# Patient Record
Sex: FEMALE | Employment: PART TIME | URBAN - METROPOLITAN AREA
[De-identification: names, ages, dates, MRNs, and addresses within clinical notes are randomized per-mention and may not be internally consistent; named-entity substitution may affect disease eponyms.]

---

## 2017-06-15 ENCOUNTER — APPOINTMENT (OUTPATIENT)
Dept: ULTRASOUND IMAGING | Age: 32
End: 2017-06-15
Attending: EMERGENCY MEDICINE
Payer: COMMERCIAL

## 2017-06-15 ENCOUNTER — APPOINTMENT (OUTPATIENT)
Dept: CT IMAGING | Age: 32
End: 2017-06-15
Attending: EMERGENCY MEDICINE
Payer: COMMERCIAL

## 2017-06-15 ENCOUNTER — HOSPITAL ENCOUNTER (EMERGENCY)
Age: 32
Discharge: HOME | End: 2017-06-15
Attending: EMERGENCY MEDICINE
Payer: COMMERCIAL

## 2017-06-15 VITALS
WEIGHT: 153.6 LBS | BODY MASS INDEX: 27.21 KG/M2 | HEART RATE: 78 BPM | TEMPERATURE: 98 F | SYSTOLIC BLOOD PRESSURE: 119 MMHG | RESPIRATION RATE: 20 BRPM | HEIGHT: 63 IN | OXYGEN SATURATION: 98 % | DIASTOLIC BLOOD PRESSURE: 75 MMHG

## 2017-06-15 DIAGNOSIS — N83.209 RUPTURED OVARIAN CYST: Primary | ICD-10-CM

## 2017-06-15 LAB
ALBUMIN SERPL BCP-MCNC: 3.5 G/DL (ref 3.5–5)
ALBUMIN/GLOB SERPL: 0.9 {RATIO} (ref 1.1–2.2)
ALP SERPL-CCNC: 46 U/L (ref 45–117)
ALT SERPL-CCNC: 13 U/L (ref 12–78)
ANION GAP BLD CALC-SCNC: 8 MMOL/L (ref 5–15)
APPEARANCE UR: ABNORMAL
AST SERPL W P-5'-P-CCNC: 10 U/L (ref 15–37)
BACTERIA URNS QL MICRO: NEGATIVE /HPF
BASOPHILS # BLD AUTO: 0 K/UL (ref 0–0.1)
BASOPHILS # BLD: 0 % (ref 0–1)
BILIRUB SERPL-MCNC: 0.8 MG/DL (ref 0.2–1)
BILIRUB UR QL CFM: POSITIVE
BUN SERPL-MCNC: 7 MG/DL (ref 6–20)
BUN/CREAT SERPL: 11 (ref 12–20)
CALCIUM SERPL-MCNC: 8.2 MG/DL (ref 8.5–10.1)
CHLORIDE SERPL-SCNC: 105 MMOL/L (ref 97–108)
CO2 SERPL-SCNC: 28 MMOL/L (ref 21–32)
COLOR UR: ABNORMAL
CREAT SERPL-MCNC: 0.63 MG/DL (ref 0.55–1.02)
EOSINOPHIL # BLD: 0.1 K/UL (ref 0–0.4)
EOSINOPHIL NFR BLD: 2 % (ref 0–7)
EPITH CASTS URNS QL MICRO: ABNORMAL /LPF
ERYTHROCYTE [DISTWIDTH] IN BLOOD BY AUTOMATED COUNT: 15.1 % (ref 11.5–14.5)
ERYTHROCYTE [DISTWIDTH] IN BLOOD BY AUTOMATED COUNT: 15.2 % (ref 11.5–14.5)
GLOBULIN SER CALC-MCNC: 3.7 G/DL (ref 2–4)
GLUCOSE SERPL-MCNC: 126 MG/DL (ref 65–100)
GLUCOSE UR STRIP.AUTO-MCNC: NEGATIVE MG/DL
HCG UR QL: NEGATIVE
HCT VFR BLD AUTO: 27.6 % (ref 35–47)
HCT VFR BLD AUTO: 32.4 % (ref 35–47)
HGB BLD-MCNC: 8.5 G/DL (ref 11.5–16)
HGB BLD-MCNC: 9.9 G/DL (ref 11.5–16)
HGB UR QL STRIP: ABNORMAL
HYALINE CASTS URNS QL MICRO: ABNORMAL /LPF (ref 0–5)
KETONES UR QL STRIP.AUTO: 15 MG/DL
LEUKOCYTE ESTERASE UR QL STRIP.AUTO: NEGATIVE
LIPASE SERPL-CCNC: 77 U/L (ref 73–393)
LYMPHOCYTES # BLD AUTO: 33 % (ref 12–49)
LYMPHOCYTES # BLD: 2 K/UL (ref 0.8–3.5)
MCH RBC QN AUTO: 24.1 PG (ref 26–34)
MCH RBC QN AUTO: 24.3 PG (ref 26–34)
MCHC RBC AUTO-ENTMCNC: 30.6 G/DL (ref 30–36.5)
MCHC RBC AUTO-ENTMCNC: 30.8 G/DL (ref 30–36.5)
MCV RBC AUTO: 78.4 FL (ref 80–99)
MCV RBC AUTO: 79.4 FL (ref 80–99)
MONOCYTES # BLD: 0.5 K/UL (ref 0–1)
MONOCYTES NFR BLD AUTO: 8 % (ref 5–13)
NEUTS SEG # BLD: 3.6 K/UL (ref 1.8–8)
NEUTS SEG NFR BLD AUTO: 57 % (ref 32–75)
NITRITE UR QL STRIP.AUTO: NEGATIVE
PH UR STRIP: 6 [PH] (ref 5–8)
PLATELET # BLD AUTO: 281 K/UL (ref 150–400)
PLATELET # BLD AUTO: 339 K/UL (ref 150–400)
POTASSIUM SERPL-SCNC: 3.3 MMOL/L (ref 3.5–5.1)
PROT SERPL-MCNC: 7.2 G/DL (ref 6.4–8.2)
PROT UR STRIP-MCNC: 30 MG/DL
RBC # BLD AUTO: 3.52 M/UL (ref 3.8–5.2)
RBC # BLD AUTO: 4.08 M/UL (ref 3.8–5.2)
RBC #/AREA URNS HPF: >100 /HPF (ref 0–5)
SODIUM SERPL-SCNC: 141 MMOL/L (ref 136–145)
SP GR UR REFRACTOMETRY: 1.02 (ref 1–1.03)
UROBILINOGEN UR QL STRIP.AUTO: 1 EU/DL (ref 0.2–1)
WBC # BLD AUTO: 5.9 K/UL (ref 3.6–11)
WBC # BLD AUTO: 6.2 K/UL (ref 3.6–11)
WBC URNS QL MICRO: ABNORMAL /HPF (ref 0–4)

## 2017-06-15 PROCEDURE — 36415 COLL VENOUS BLD VENIPUNCTURE: CPT | Performed by: EMERGENCY MEDICINE

## 2017-06-15 PROCEDURE — 80053 COMPREHEN METABOLIC PANEL: CPT | Performed by: EMERGENCY MEDICINE

## 2017-06-15 PROCEDURE — 76830 TRANSVAGINAL US NON-OB: CPT

## 2017-06-15 PROCEDURE — 99285 EMERGENCY DEPT VISIT HI MDM: CPT

## 2017-06-15 PROCEDURE — 96361 HYDRATE IV INFUSION ADD-ON: CPT

## 2017-06-15 PROCEDURE — 85025 COMPLETE CBC W/AUTO DIFF WBC: CPT | Performed by: EMERGENCY MEDICINE

## 2017-06-15 PROCEDURE — 83690 ASSAY OF LIPASE: CPT | Performed by: EMERGENCY MEDICINE

## 2017-06-15 PROCEDURE — 74011250636 HC RX REV CODE- 250/636: Performed by: EMERGENCY MEDICINE

## 2017-06-15 PROCEDURE — 85027 COMPLETE CBC AUTOMATED: CPT | Performed by: OBSTETRICS & GYNECOLOGY

## 2017-06-15 PROCEDURE — 76856 US EXAM PELVIC COMPLETE: CPT

## 2017-06-15 PROCEDURE — 74177 CT ABD & PELVIS W/CONTRAST: CPT

## 2017-06-15 PROCEDURE — 74011000258 HC RX REV CODE- 258: Performed by: EMERGENCY MEDICINE

## 2017-06-15 PROCEDURE — 96374 THER/PROPH/DIAG INJ IV PUSH: CPT

## 2017-06-15 PROCEDURE — 96375 TX/PRO/DX INJ NEW DRUG ADDON: CPT

## 2017-06-15 PROCEDURE — 81001 URINALYSIS AUTO W/SCOPE: CPT | Performed by: EMERGENCY MEDICINE

## 2017-06-15 PROCEDURE — 96376 TX/PRO/DX INJ SAME DRUG ADON: CPT

## 2017-06-15 PROCEDURE — 74011636320 HC RX REV CODE- 636/320: Performed by: EMERGENCY MEDICINE

## 2017-06-15 PROCEDURE — 81025 URINE PREGNANCY TEST: CPT

## 2017-06-15 RX ORDER — MORPHINE SULFATE 4 MG/ML
4 INJECTION, SOLUTION INTRAMUSCULAR; INTRAVENOUS ONCE
Status: COMPLETED | OUTPATIENT
Start: 2017-06-15 | End: 2017-06-15

## 2017-06-15 RX ORDER — HYDROCODONE BITARTRATE AND ACETAMINOPHEN 5; 325 MG/1; MG/1
1 TABLET ORAL
Qty: 20 TAB | Refills: 0 | Status: SHIPPED | OUTPATIENT
Start: 2017-06-15 | End: 2017-07-07

## 2017-06-15 RX ORDER — IBUPROFEN 600 MG/1
600 TABLET ORAL
Qty: 20 TAB | Refills: 0 | Status: SHIPPED | OUTPATIENT
Start: 2017-06-15 | End: 2017-07-07

## 2017-06-15 RX ORDER — ONDANSETRON 4 MG/1
4 TABLET, ORALLY DISINTEGRATING ORAL
Qty: 8 TAB | Refills: 0 | Status: SHIPPED | OUTPATIENT
Start: 2017-06-15 | End: 2017-07-07

## 2017-06-15 RX ORDER — SODIUM CHLORIDE 0.9 % (FLUSH) 0.9 %
10 SYRINGE (ML) INJECTION
Status: COMPLETED | OUTPATIENT
Start: 2017-06-15 | End: 2017-06-15

## 2017-06-15 RX ORDER — ONDANSETRON 2 MG/ML
4 INJECTION INTRAMUSCULAR; INTRAVENOUS
Status: COMPLETED | OUTPATIENT
Start: 2017-06-15 | End: 2017-06-15

## 2017-06-15 RX ADMIN — IOPAMIDOL 100 ML: 755 INJECTION, SOLUTION INTRAVENOUS at 12:46

## 2017-06-15 RX ADMIN — Medication 4 MG: at 09:14

## 2017-06-15 RX ADMIN — Medication 4 MG: at 11:37

## 2017-06-15 RX ADMIN — ONDANSETRON 4 MG: 2 INJECTION INTRAMUSCULAR; INTRAVENOUS at 09:14

## 2017-06-15 RX ADMIN — SODIUM CHLORIDE 100 ML: 900 INJECTION, SOLUTION INTRAVENOUS at 12:46

## 2017-06-15 RX ADMIN — Medication 10 ML: at 12:46

## 2017-06-15 RX ADMIN — SODIUM CHLORIDE 1000 ML: 900 INJECTION, SOLUTION INTRAVENOUS at 11:37

## 2017-06-15 NOTE — DISCHARGE INSTRUCTIONS
We hope that we have addressed all of your medical concerns. The examination and treatment you received in the Emergency Department were for an emergent problem and were not intended as complete care. It is important that you follow up with your healthcare provider(s) for ongoing care. If your symptoms worsen or do not improve as expected, and you are unable to reach your usual health care provider(s), you should return to the Emergency Department. Today's healthcare is undergoing tremendous change, and patient satisfaction surveys are one of the many tools to assess the quality of medical care. You may receive a survey from the [employer] [place] [employer] regarding your experience in the Emergency Department. I hope that your experience has been completely positive, particularly the medical care that I provided. As such, please participate in the survey; anything less than excellent does not meet my expectations or intentions. [facility] and [facility] participate in nationally recognized quality of care measures. If your blood pressure is greater than 120/80, as reported below, we urge that you seek medical care to address the potential of high blood pressure, commonly known as hypertension. Hypertension can be hereditary or can be caused by certain medical conditions, pain, stress, or \"white coat syndrome. \"       Please make an appointment with your health care provider(s) for follow up of your Emergency Department visit. VITALS:   Patient Vitals for the past 8 hrs:   Temp Pulse Resp BP SpO2   06/15/17 1430 - - - 119/62 99 %   06/15/17 1400 - - - 136/85 100 %   06/15/17 1300 - - - 133/86 99 %   06/15/17 1200 - - - 121/54 98 %   06/15/17 1100 - - - 124/81 98 %   06/15/17 0830 - - - 118/77 92 %   06/15/17 0807 97.7 °F (36.5 °C) 88 16 114/77 97 %          Thank you for allowing us to provide you with medical care today.   We realize that you have many choices for your emergency care needs. Please choose us in the future for any continued health care needs. Dane Vinson MD    [facility]   Office: [phone]            Recent Results (from the past 24 hour(s))   CBC WITH AUTOMATED DIFF    Collection Time: 06/15/17  8:13 AM   Result Value Ref Range    WBC 6.2 3.6 - 11.0 K/uL    RBC 4.08 3.80 - 5.20 M/uL    HGB 9.9 (L) 11.5 - 16.0 g/dL    HCT 32.4 (L) 35.0 - 47.0 %    MCV 79.4 (L) 80.0 - 99.0 FL    MCH 24.3 (L) 26.0 - 34.0 PG    MCHC 30.6 30.0 - 36.5 g/dL    RDW 15.1 (H) 11.5 - 14.5 %    PLATELET 044 135 - 957 K/uL    NEUTROPHILS 57 32 - 75 %    LYMPHOCYTES 33 12 - 49 %    MONOCYTES 8 5 - 13 %    EOSINOPHILS 2 0 - 7 %    BASOPHILS 0 0 - 1 %    ABS. NEUTROPHILS 3.6 1.8 - 8.0 K/UL    ABS. LYMPHOCYTES 2.0 0.8 - 3.5 K/UL    ABS. MONOCYTES 0.5 0.0 - 1.0 K/UL    ABS. EOSINOPHILS 0.1 0.0 - 0.4 K/UL    ABS. BASOPHILS 0.0 0.0 - 0.1 K/UL   METABOLIC PANEL, COMPREHENSIVE    Collection Time: 06/15/17  8:13 AM   Result Value Ref Range    Sodium 141 136 - 145 mmol/L    Potassium 3.3 (L) 3.5 - 5.1 mmol/L    Chloride 105 97 - 108 mmol/L    CO2 28 21 - 32 mmol/L    Anion gap 8 5 - 15 mmol/L    Glucose 126 (H) 65 - 100 mg/dL    BUN 7 6 - 20 MG/DL    Creatinine 0.63 0.55 - 1.02 MG/DL    BUN/Creatinine ratio 11 (L) 12 - 20      GFR est AA >60 >60 ml/min/1.73m2    GFR est non-AA >60 >60 ml/min/1.73m2    Calcium 8.2 (L) 8.5 - 10.1 MG/DL    Bilirubin, total 0.8 0.2 - 1.0 MG/DL    ALT (SGPT) 13 12 - 78 U/L    AST (SGOT) 10 (L) 15 - 37 U/L    Alk.  phosphatase 46 45 - 117 U/L    Protein, total 7.2 6.4 - 8.2 g/dL    Albumin 3.5 3.5 - 5.0 g/dL    Globulin 3.7 2.0 - 4.0 g/dL    A-G Ratio 0.9 (L) 1.1 - 2.2     LIPASE    Collection Time: 06/15/17  8:13 AM   Result Value Ref Range    Lipase 77 73 - 393 U/L   URINALYSIS W/ RFLX MICROSCOPIC    Collection Time: 06/15/17 10:15 AM   Result Value Ref Range    Color DARK YELLOW      Appearance CLOUDY (A) CLEAR      Specific gravity 1.025 1.003 - 1.030      pH (UA) 6.0 5.0 - 8.0      Protein 30 (A) NEG mg/dL    Glucose NEGATIVE  NEG mg/dL    Ketone 15 (A) NEG mg/dL    Blood LARGE (A) NEG      Urobilinogen 1.0 0.2 - 1.0 EU/dL    Nitrites NEGATIVE  NEG      Leukocyte Esterase NEGATIVE  NEG      WBC 0-4 0 - 4 /hpf    RBC >100 (H) 0 - 5 /hpf    Epithelial cells FEW FEW /lpf    Bacteria NEGATIVE  NEG /hpf    Hyaline cast 2-5 0 - 5 /lpf   BILIRUBIN, CONFIRM    Collection Time: 06/15/17 10:15 AM   Result Value Ref Range    Bilirubin UA, confirm POSITIVE (A) NEG     HCG URINE, QL. - POC    Collection Time: 06/15/17 10:26 AM   Result Value Ref Range    Pregnancy test,urine (POC) NEGATIVE  NEG     CBC W/O DIFF    Collection Time: 06/15/17  1:50 PM   Result Value Ref Range    WBC 5.9 3.6 - 11.0 K/uL    RBC 3.52 (L) 3.80 - 5.20 M/uL    HGB 8.5 (L) 11.5 - 16.0 g/dL    HCT 27.6 (L) 35.0 - 47.0 %    MCV 78.4 (L) 80.0 - 99.0 FL    MCH 24.1 (L) 26.0 - 34.0 PG    MCHC 30.8 30.0 - 36.5 g/dL    RDW 15.2 (H) 11.5 - 14.5 %    PLATELET 924 322 - 250 K/uL       Ct Abd Pelv W Cont    Result Date: 6/15/2017  INDICATION: free fluid  possible hemoperitoneum COMPARISON: Earlier pelvic ultrasound. TECHNIQUE: Following the uneventful intravenous administration of 100 cc Isovue-370, thin axial images were obtained through the abdomen and pelvis. Coronal and sagittal reconstructions were generated. Oral contrast was not administered. CT dose reduction was achieved through use of a standardized protocol tailored for this examination and automatic exposure control for dose modulation. FINDINGS: LUNG BASES: Clear. LIVER: No mass or biliary dilatation. GALLBLADDER: Unremarkable. SPLEEN: No mass. PANCREAS: No mass or ductal dilatation. ADRENALS: Unremarkable. KIDNEYS: No mass, calculus, or hydronephrosis. GI: No dilatation or wall thickening. APPENDIX: Not confidently identified. PERITONEUM: There is no pneumoperitoneum.  There is generalized peritoneal fluid which has not density of 33. This is higher than water. As suggested by the previous ultrasound this may be complex fluid and hemoperitoneum cannot be excluded by this exam. RETROPERITONEUM: No lymphadenopathy or aortic aneurysm. URINARY BLADDER: No mass or calculus. PELVIS: There is marked enlargement of the uterus. The endometrial cavity is identified on coronal imaging (424-89). Cephalad of this is a large rounded heterogeneously enhancing mass. This is 13.6 cm in diameter and consistent with a large uterine fibroid. Adnexa cannot be identified. BONES: No destructive bone lesion. ADDITIONAL COMMENTS: N/A     IMPRESSION: There is generalized ascites which has density of 33. This may be complex ascites. As suggested by ultrasound, hemoperitoneum cannot be totally excluded although this is slightly less dense than usual for hemoperitoneum. There is a large fibroid arising from the uterus. Us Transvaginal    Result Date: 6/15/2017  CLINICAL HISTORY: Lower abdominal pain, history of fibroids Pelvic ultrasound: Realtime sonographic imaging of the pelvis was performed transabdominally. The uterus measures 18.8 x 14.2 x 10.9 cm and is myomatous in appearance. The endometrial stripe and ovaries are not visualized  due to the enlarged myomatous uterus. Moderate ascites is noted in the abdomen and pelvis, somewhat heterogeneous in the pelvis. IMPRESSION: Myomatous uterus. Moderate ascites, somewhat heterogeneous concerning for blood products. Nonvisualization of the ovaries and endometrium. Correlation with transvaginal ultrasound will be performed. Transvaginal ultrasound: Realtime sonographic imaging of the pelvis was performed transvaginally. The uterus is enlarged and myomatous in appearance. The largest fibroid measures 12.7 x 14.8 x 12.1 cm in the left uterine body. The endometrial stripe measures 14 mm.  The right ovary measures 3.4 x 2.2 x 2.2 cm and the left ovary measures 3.7 x 2.1 x 2.4 cm. There is a 1.6 x 1.8 x 1.2 cm simple cyst in the left ovary. The right ovary is normal in appearance. Blood flow is documented within both ovaries. Heterogeneous free fluid is noted in the pelvis. IMPRESSION: Heterogeneous free fluid in the pelvis concerning for hemoperitoneum. This may be related to ruptured cyst but other etiologies cannot be excluded. Enlarged myomatous uterus. Simple cyst left ovary. Us Pelv Non Obs    Result Date: 6/15/2017  CLINICAL HISTORY: Lower abdominal pain, history of fibroids Pelvic ultrasound: Realtime sonographic imaging of the pelvis was performed transabdominally. The uterus measures 18.8 x 14.2 x 10.9 cm and is myomatous in appearance. The endometrial stripe and ovaries are not visualized  due to the enlarged myomatous uterus. Moderate ascites is noted in the abdomen and pelvis, somewhat heterogeneous in the pelvis. IMPRESSION: Myomatous uterus. Moderate ascites, somewhat heterogeneous concerning for blood products. Nonvisualization of the ovaries and endometrium. Correlation with transvaginal ultrasound will be performed. Transvaginal ultrasound: Realtime sonographic imaging of the pelvis was performed transvaginally. The uterus is enlarged and myomatous in appearance. The largest fibroid measures 12.7 x 14.8 x 12.1 cm in the left uterine body. The endometrial stripe measures 14 mm. The right ovary measures 3.4 x 2.2 x 2.2 cm and the left ovary measures 3.7 x 2.1 x 2.4 cm. There is a 1.6 x 1.8 x 1.2 cm simple cyst in the left ovary. The right ovary is normal in appearance. Blood flow is documented within both ovaries. Heterogeneous free fluid is noted in the pelvis. IMPRESSION: Heterogeneous free fluid in the pelvis concerning for hemoperitoneum. This may be related to ruptured cyst but other etiologies cannot be excluded. Enlarged myomatous uterus. Simple cyst left ovary.

## 2017-06-15 NOTE — LETTER
[address] 53 
[address] [place] [facility] [id number] 
[phone] Work/School Note Date: 6/15/2017 To Whom It May concern: 
 
Dickson Reed was seen and treated today in the emergency room by the following provider(s): 
No providers found. Dickson Reed may return to work on 6/17/2017. Sincerely, Liborio Reyes RN

## 2017-06-15 NOTE — PROGRESS NOTES
OB Hospitalist Note:    To ED to see pt with known fibroids with abdominal pain. Discussed with Dr. Juve Dumont. US reviewed. To ED to see pt and she is not in room.  Gone to Drea Fleming MD

## 2017-06-15 NOTE — CONSULTS
Gyn Consult    Subjective:     Edvin Barrios is a 33 y.o.  A3  premenopausal female who is being seen for abdominal pain. Pt reports she has know she had fibroids since November of last year. She doesn't have a specific plan in place and reports that Dr. Antonetta Sandifer of [facility] is her GYN MD. She reports her LMP was 6-3 and no birth control as she is not currently sexually active. She has some abdominal pain on  that she thought was due to something she ate. She felt better and then ran errands yesterday and had the pain again today. She reports the pain is more crampy than sharp and she reports it is lower abdominal in location. She denies nausea, vomiting, fever, chills, headache, vaginal discharge, UTI symptoms but does have some irregular spotting. OB/GYN ROS: no vaginal bleeding, no discharge or pelvic pain, cyclic withdrawal menses only, no hot flashes, she complains of above    OB/GYN history: obstetric history: ( : 4, Para: 1, Misc/Ab: 3) and contraception (none)  x 1 age 6, SAB x 2, ectopic x 1 with salpingectomy on right. Patient Active Problem List    Diagnosis Date Noted    Anemia 2010     Past Medical History:   Diagnosis Date    Ectopic pregnancy     Fibroids     uterine      Past Surgical History:   Procedure Laterality Date    HX GYN      1 tube removed for ectopic pregnancy      Social History   Substance Use Topics    Smoking status: Never Smoker    Smokeless tobacco: Never Used    Alcohol use No      Family History   Problem Relation Age of Onset    Hypertension Mother       None     No Known Allergies     Review of Systems:  10 point ROS,  Pertinent items are noted in the History of Present Illness.      Objective:     Patient Vitals for the past 8 hrs:   BP Temp Pulse Resp SpO2 Height Weight   06/15/17 1300 133/86 - - - 99 % - -   06/15/17 1100 124/81 - - - 98 % - -   06/15/17 0830 118/77 - - - 92 % - -   06/15/17 4643 114/77 97.7 °F (36.5 °C) 88 16 97 % 5' 3\" (1.6 m) 69.7 kg (153 lb 9.6 oz)     Temp (24hrs), Av.7 °F (36.5 °C), Min:97.7 °F (36.5 °C), Max:97.7 °F (36.5 °C)         Physical Exam: pt was resting comfortably when I entered the room  Abdomen: soft, non-tender. Bowel sounds normal. Uterus approximately 24 week size but nontender, NO rebound and NO guarding  Pelvic: deferred as pt plans on following up with her own private GYN MD    Labs:    Recent Results (from the past 24 hour(s))   CBC WITH AUTOMATED DIFF    Collection Time: 06/15/17  8:13 AM   Result Value Ref Range    WBC 6.2 3.6 - 11.0 K/uL    RBC 4.08 3.80 - 5.20 M/uL    HGB 9.9 (L) 11.5 - 16.0 g/dL    HCT 32.4 (L) 35.0 - 47.0 %    MCV 79.4 (L) 80.0 - 99.0 FL    MCH 24.3 (L) 26.0 - 34.0 PG    MCHC 30.6 30.0 - 36.5 g/dL    RDW 15.1 (H) 11.5 - 14.5 %    PLATELET 246 194 - 986 K/uL    NEUTROPHILS 57 32 - 75 %    LYMPHOCYTES 33 12 - 49 %    MONOCYTES 8 5 - 13 %    EOSINOPHILS 2 0 - 7 %    BASOPHILS 0 0 - 1 %    ABS. NEUTROPHILS 3.6 1.8 - 8.0 K/UL    ABS. LYMPHOCYTES 2.0 0.8 - 3.5 K/UL    ABS. MONOCYTES 0.5 0.0 - 1.0 K/UL    ABS. EOSINOPHILS 0.1 0.0 - 0.4 K/UL    ABS. BASOPHILS 0.0 0.0 - 0.1 K/UL   METABOLIC PANEL, COMPREHENSIVE    Collection Time: 06/15/17  8:13 AM   Result Value Ref Range    Sodium 141 136 - 145 mmol/L    Potassium 3.3 (L) 3.5 - 5.1 mmol/L    Chloride 105 97 - 108 mmol/L    CO2 28 21 - 32 mmol/L    Anion gap 8 5 - 15 mmol/L    Glucose 126 (H) 65 - 100 mg/dL    BUN 7 6 - 20 MG/DL    Creatinine 0.63 0.55 - 1.02 MG/DL    BUN/Creatinine ratio 11 (L) 12 - 20      GFR est AA >60 >60 ml/min/1.73m2    GFR est non-AA >60 >60 ml/min/1.73m2    Calcium 8.2 (L) 8.5 - 10.1 MG/DL    Bilirubin, total 0.8 0.2 - 1.0 MG/DL    ALT (SGPT) 13 12 - 78 U/L    AST (SGOT) 10 (L) 15 - 37 U/L    Alk.  phosphatase 46 45 - 117 U/L    Protein, total 7.2 6.4 - 8.2 g/dL    Albumin 3.5 3.5 - 5.0 g/dL    Globulin 3.7 2.0 - 4.0 g/dL    A-G Ratio 0.9 (L) 1.1 - 2.2     LIPASE    Collection Time: 06/15/17  8:13 AM   Result Value Ref Range    Lipase 77 73 - 393 U/L   URINALYSIS W/ RFLX MICROSCOPIC    Collection Time: 06/15/17 10:15 AM   Result Value Ref Range    Color DARK YELLOW      Appearance CLOUDY (A) CLEAR      Specific gravity 1.025 1.003 - 1.030      pH (UA) 6.0 5.0 - 8.0      Protein 30 (A) NEG mg/dL    Glucose NEGATIVE  NEG mg/dL    Ketone 15 (A) NEG mg/dL    Blood LARGE (A) NEG      Urobilinogen 1.0 0.2 - 1.0 EU/dL    Nitrites NEGATIVE  NEG      Leukocyte Esterase NEGATIVE  NEG      WBC 0-4 0 - 4 /hpf    RBC >100 (H) 0 - 5 /hpf    Epithelial cells FEW FEW /lpf    Bacteria NEGATIVE  NEG /hpf    Hyaline cast 2-5 0 - 5 /lpf   BILIRUBIN, CONFIRM    Collection Time: 06/15/17 10:15 AM   Result Value Ref Range    Bilirubin UA, confirm POSITIVE (A) NEG     HCG URINE, QL. - POC    Collection Time: 06/15/17 10:26 AM   Result Value Ref Range    Pregnancy test,urine (POC) NEGATIVE  NEG     CBC W/O DIFF    Collection Time: 06/15/17  1:50 PM   Result Value Ref Range    WBC 5.9 3.6 - 11.0 K/uL    RBC 3.52 (L) 3.80 - 5.20 M/uL    HGB 8.5 (L) 11.5 - 16.0 g/dL    HCT 27.6 (L) 35.0 - 47.0 %    MCV 78.4 (L) 80.0 - 99.0 FL    MCH 24.1 (L) 26.0 - 34.0 PG    MCHC 30.8 30.0 - 36.5 g/dL    RDW 15.2 (H) 11.5 - 14.5 %    PLATELET 511 797 - 080 K/uL       Imaging: CT scan IMPRESSION: There is generalized ascites which has density of 33. This may be complex ascites. As suggested by ultrasound, hemoperitoneum cannot be totally excluded  although this is slightly less dense than usual for hemoperitoneum. There is a large fibroid arising from the uterus. Ultrasound IMPRESSION: Heterogeneous free fluid in the pelvis concerning for hemoperitoneum. This may be related to ruptured cyst but other etiologies cannot be excluded. Enlarged myomatous uterus. Simple cyst left ovary.       Assessment:     Active Problems:    * No active hospital problems.  *      Plan:   33 y.o.  A3  premenopausal female LMP 6-3 and no birth control who is being seen for abdominal pain. Pt reports she has know she had fibroids since November of last year. She doesn't have a specific plan in place and reports that Dr. Diana Sanchez of [facility] is her GYN MD. Probable hemorrhagic ovarian cyst.  1. I reviewed with Mamta Akhtar her medical records, physical exam, and review of symptoms. 2. All the etiologies of her abdominal pain were reviewed and fibroids can cause abdominal cramping but this sharp pain and fluid in abdominal seem consistent with a ruptured ovarian cyst.  3. Pt reports that she did have a similar episode of pain like this in February and she suspects that was an ovarian cyst too. 4. Surgical treatment options of her fibroids were discussed and she will follow up with Dr. Nata Vann. 5. Risks, benefits, alternatives discussed with patient  6. All questions answered and she agrees with the plan  7. Plan discharge from ED as she does not have a surgical abdomen and is now more comfortable  8.  Pt reports she needs a note for work    Signed By: Octavio Craig MD     Asuncion 15, 2017

## 2017-06-15 NOTE — ED NOTES
Discharge instructions given to pt. All questions answered and pt verbalized understanding. V/S stable @ time of discharge. No lines or drains in place. Pt ambulatory out of unit.
Patient to CT scan.
Patient

## 2017-06-15 NOTE — ED TRIAGE NOTES
Pt. complains of abd. pain described as cramps that started Sunday, got better but worsened this am upon waking. Vomited on Sunday and Monday but nothing since. Last BM was this am and was normal per pt.

## 2017-06-15 NOTE — ED PROVIDER NOTES
HPI Comments: 33 y.o. female with past medical history significant for ectopic pregnancy and fibroids who presents with chief complaint of abdominal pain. Pt complains of bilateral lower abd pain that began this morning when she woke from sleeping. Pt describes her pain as \"crampy\" and intermittent. She notes that 4 days ago she experienced a similar episode of pain with associated vomiting, but states that the pain resolved on it's own. There was no vomiting today. Last BM was this morning and normal. Additionally, pt complains of some mild vaginal spotting that began this morning. She notes a h/o uterine fibroids and states that she occasionally has \"breakthrough bleeding. \" LMP was 1 week ago and normal. Pt is  P:1 A:2 with one ectopic pregnancy. Pt denies any vaginal discharge or urinary sx. There are no other acute medical concerns at this time. Social hx: has a 8 y.o. child  PCP: No primary care provider on file. Note written by Natan Tenorio. Christiano Goddard, as dictated by Rico Buenrostro MD 8:41 AM       The history is provided by the patient. No  was used. Past Medical History:   Diagnosis Date    Ectopic pregnancy     Fibroids     uterine       Past Surgical History:   Procedure Laterality Date    HX GYN      1 tube removed for ectopic pregnancy         Family History:   Problem Relation Age of Onset    Hypertension Mother        Social History     Social History    Marital status: SINGLE     Spouse name: N/A    Number of children: N/A    Years of education: N/A     Occupational History    Not on file. Social History Main Topics    Smoking status: Never Smoker    Smokeless tobacco: Never Used    Alcohol use No    Drug use: No    Sexual activity: Not on file     Other Topics Concern    Not on file     Social History Narrative         ALLERGIES: Review of patient's allergies indicates no known allergies.     Review of Systems   Constitutional: Negative for appetite change, chills and fever. HENT: Negative for rhinorrhea, sore throat and trouble swallowing. Eyes: Negative for photophobia. Respiratory: Negative for cough and shortness of breath. Cardiovascular: Negative for chest pain and palpitations. Gastrointestinal: Positive for abdominal pain. Negative for nausea and vomiting. Genitourinary: Positive for vaginal bleeding (spotting). Negative for dysuria, frequency, hematuria and vaginal discharge. Musculoskeletal: Negative for arthralgias. Neurological: Negative for dizziness, syncope and weakness. Psychiatric/Behavioral: Negative for behavioral problems. The patient is not nervous/anxious. All other systems reviewed and are negative. Vitals:    06/15/17 0807   BP: 114/77   Pulse: 88   Resp: 16   Temp: 97.7 °F (36.5 °C)   SpO2: 97%   Weight: 69.7 kg (153 lb 9.6 oz)   Height: 5' 3\" (1.6 m)            Physical Exam   Constitutional: She appears well-developed and well-nourished. HENT:   Head: Normocephalic and atraumatic. Mouth/Throat: Oropharynx is clear and moist.   Eyes: EOM are normal. Pupils are equal, round, and reactive to light. Neck: Normal range of motion. Neck supple. Cardiovascular: Normal rate, regular rhythm, normal heart sounds and intact distal pulses. Exam reveals no gallop and no friction rub. No murmur heard. Pulmonary/Chest: Effort normal. No respiratory distress. She has no wheezes. She has no rales. Abdominal: Soft. She exhibits mass. There is tenderness. There is no rebound. There is a mass in the suprapubic abdomen, palpable at the umbilicus; it is firm and tender   Musculoskeletal: Normal range of motion. She exhibits no tenderness. Neurological: She is alert. No cranial nerve deficit. Motor; symmetric   Skin: No erythema. Psychiatric: She has a normal mood and affect. Her behavior is normal.   Nursing note and vitals reviewed. Note written by Gee Perdomo.  Reuben Meneses, as dictated by Shira Gavin General Deon MD 8:41 AM         [facility]  ED Course       Procedures    CONSULT NOTE:  12:11 PM Rico Buenrostro MD spoke with Dr. Ramses Bird for General Surgery. Discussed available diagnostic tests and clinical findings. He is in agreement with care plans as outlined. Dr. Pema Marcial will see the patient once the CT scan results and recommends consulting GYN to discuss the case. CONSULT NOTE:  12:20 PM Rico Buenrostro MD spoke with Dr. Unique Monique, Consult for OB/GYN. Discussed available diagnostic tests and clinical findings. She is in agreement with care plans as outlined.   Dr. Damir Walker will see the patient in the ED and assess her for admission to the hospital.

## 2017-06-15 NOTE — CONSULTS
Surgery Consult    Subjective:      Solange Barcenas is a 33 y.o. female who presents complaining of crampy abdominal pain. This began about 4 days ago. At that time she had nausea and vomiting that got better by itself. This came back again today. She also noted vaginal bleeding which she says she gets because of her fibroids. She denies any trauma. She states that she is usually anemic and does not take her Iron pills the way she should. Patient Active Problem List    Diagnosis Date Noted    Anemia 11/22/2010     Past Medical History:   Diagnosis Date    Ectopic pregnancy     Fibroids     uterine      Past Surgical History:   Procedure Laterality Date    HX GYN      1 tube removed for ectopic pregnancy      Social History   Substance Use Topics    Smoking status: Never Smoker    Smokeless tobacco: Never Used    Alcohol use No      Family History   Problem Relation Age of Onset    Hypertension Mother       No current facility-administered medications for this encounter. No current outpatient prescriptions on file.       No Known Allergies    Review of Systems:    Constitutional: negative  Eyes: negative  Ears, Nose, Mouth, Throat, and Face: negative  Respiratory: negative  Cardiovascular: negative  Gastrointestinal: negative  Genitourinary:positive for spotting  Integument/Breast: negative  Hematologic/Lymphatic: negative  Musculoskeletal:negative  Neurological: negative  Behavioral/Psychiatric: negative  Endocrine: negative  Allergic/Immunologic: negative    Objective:        Visit Vitals    /86    Pulse 88    Temp 97.7 °F (36.5 °C)    Resp 16    Ht 5' 3\" (1.6 m)    Wt 153 lb 9.6 oz (69.7 kg)    LMP 06/03/2017    SpO2 99%    BMI 27.21 kg/m2       Physical Exam:  GENERAL: alert, cooperative, no distress, appears stated age, EYE: negative, THROAT & NECK: normal, LUNG: clear to auscultation bilaterally, HEART: regular rate and rhythm, ABDOMEN: soft with mild tenderness There is a large mass extending above the umbilicus, EXTREMITIES:  extremities normal, atraumatic, no cyanosis or edema, SKIN: Normal., NEUROLOGIC: negative, PSYCH: non focal    Imaging:  images and reports reviewed -Seen directly with the Radiologist.   Ultrasound- Transvaginal ultrasound: Realtime sonographic imaging of the pelvis was  performed transvaginally. The uterus is enlarged and myomatous in appearance. The largest fibroid measures 12.7 x 14.8 x 12.1 cm in the left uterine body. The  endometrial stripe measures 14 mm. The right ovary measures 3.4 x 2.2 x 2.2 cm  and the left ovary measures 3.7 x 2.1 x 2.4 cm. There is a 1.6 x 1.8 x 1.2 cm  simple cyst in the left ovary. The right ovary is normal in appearance. Blood  flow is documented within both ovaries. Heterogeneous free fluid is noted in the  pelvis.      IMPRESSION: Heterogeneous free fluid in the pelvis concerning for  hemoperitoneum. This may be related to ruptured cyst but other etiologies cannot  be excluded. Enlarged myomatous uterus. Simple cyst left ovary.     CT scan- There is generalized ascites which has density of 33. This may be complex  ascites. As suggested by ultrasound, hemoperitoneum cannot be totally excluded  although this is slightly less dense than usual for hemoperitoneum. There is a  large fibroid arising from the uterus. Lab/Data Review: All lab results for the last 24 hours reviewed. Recent Results (from the past 24 hour(s))   CBC WITH AUTOMATED DIFF    Collection Time: 06/15/17  8:13 AM   Result Value Ref Range    WBC 6.2 3.6 - 11.0 K/uL    RBC 4.08 3.80 - 5.20 M/uL    HGB 9.9 (L) 11.5 - 16.0 g/dL    HCT 32.4 (L) 35.0 - 47.0 %    MCV 79.4 (L) 80.0 - 99.0 FL    MCH 24.3 (L) 26.0 - 34.0 PG    MCHC 30.6 30.0 - 36.5 g/dL    RDW 15.1 (H) 11.5 - 14.5 %    PLATELET 849 376 - 690 K/uL    NEUTROPHILS 57 32 - 75 %    LYMPHOCYTES 33 12 - 49 %    MONOCYTES 8 5 - 13 %    EOSINOPHILS 2 0 - 7 %    BASOPHILS 0 0 - 1 %    ABS.  NEUTROPHILS 3.6 1.8 - 8.0 K/UL    ABS. LYMPHOCYTES 2.0 0.8 - 3.5 K/UL    ABS. MONOCYTES 0.5 0.0 - 1.0 K/UL    ABS. EOSINOPHILS 0.1 0.0 - 0.4 K/UL    ABS. BASOPHILS 0.0 0.0 - 0.1 K/UL   METABOLIC PANEL, COMPREHENSIVE    Collection Time: 06/15/17  8:13 AM   Result Value Ref Range    Sodium 141 136 - 145 mmol/L    Potassium 3.3 (L) 3.5 - 5.1 mmol/L    Chloride 105 97 - 108 mmol/L    CO2 28 21 - 32 mmol/L    Anion gap 8 5 - 15 mmol/L    Glucose 126 (H) 65 - 100 mg/dL    BUN 7 6 - 20 MG/DL    Creatinine 0.63 0.55 - 1.02 MG/DL    BUN/Creatinine ratio 11 (L) 12 - 20      GFR est AA >60 >60 ml/min/1.73m2    GFR est non-AA >60 >60 ml/min/1.73m2    Calcium 8.2 (L) 8.5 - 10.1 MG/DL    Bilirubin, total 0.8 0.2 - 1.0 MG/DL    ALT (SGPT) 13 12 - 78 U/L    AST (SGOT) 10 (L) 15 - 37 U/L    Alk.  phosphatase 46 45 - 117 U/L    Protein, total 7.2 6.4 - 8.2 g/dL    Albumin 3.5 3.5 - 5.0 g/dL    Globulin 3.7 2.0 - 4.0 g/dL    A-G Ratio 0.9 (L) 1.1 - 2.2     LIPASE    Collection Time: 06/15/17  8:13 AM   Result Value Ref Range    Lipase 77 73 - 393 U/L   URINALYSIS W/ RFLX MICROSCOPIC    Collection Time: 06/15/17 10:15 AM   Result Value Ref Range    Color DARK YELLOW      Appearance CLOUDY (A) CLEAR      Specific gravity 1.025 1.003 - 1.030      pH (UA) 6.0 5.0 - 8.0      Protein 30 (A) NEG mg/dL    Glucose NEGATIVE  NEG mg/dL    Ketone 15 (A) NEG mg/dL    Blood LARGE (A) NEG      Urobilinogen 1.0 0.2 - 1.0 EU/dL    Nitrites NEGATIVE  NEG      Leukocyte Esterase NEGATIVE  NEG      WBC 0-4 0 - 4 /hpf    RBC >100 (H) 0 - 5 /hpf    Epithelial cells FEW FEW /lpf    Bacteria NEGATIVE  NEG /hpf    Hyaline cast 2-5 0 - 5 /lpf   BILIRUBIN, CONFIRM    Collection Time: 06/15/17 10:15 AM   Result Value Ref Range    Bilirubin UA, confirm POSITIVE (A) NEG     HCG URINE, QL. - POC    Collection Time: 06/15/17 10:26 AM   Result Value Ref Range    Pregnancy test,urine (POC) NEGATIVE  NEG     CBC W/O DIFF    Collection Time: 06/15/17  1:50 PM   Result Value Ref Range WBC 5.9 3.6 - 11.0 K/uL    RBC 3.52 (L) 3.80 - 5.20 M/uL    HGB 8.5 (L) 11.5 - 16.0 g/dL    HCT 27.6 (L) 35.0 - 47.0 %    MCV 78.4 (L) 80.0 - 99.0 FL    MCH 24.1 (L) 26.0 - 34.0 PG    MCHC 30.8 30.0 - 36.5 g/dL    RDW 15.2 (H) 11.5 - 14.5 %    PLATELET 083 258 - 547 K/uL         Assessment:     Complex fluid in the setting of possible ruptured ovarian cyst and very large Fibroid    Plan:   Does not seem to have a general surgical issue at this time  Will defer to GYN for further treatment.    Feel free to call with any questions     Signed By: Monica Wolff MD     [place] 15, 2017

## 2017-07-07 ENCOUNTER — OFFICE VISIT (OUTPATIENT)
Dept: OBGYN CLINIC | Age: 32
End: 2017-07-07

## 2017-07-07 VITALS
DIASTOLIC BLOOD PRESSURE: 90 MMHG | RESPIRATION RATE: 19 BRPM | SYSTOLIC BLOOD PRESSURE: 161 MMHG | HEART RATE: 77 BPM | BODY MASS INDEX: 26.12 KG/M2 | HEIGHT: 64 IN | WEIGHT: 153 LBS

## 2017-07-07 DIAGNOSIS — R10.2 PELVIC PAIN IN FEMALE: Primary | ICD-10-CM

## 2017-07-07 DIAGNOSIS — D50.9 IRON DEFICIENCY ANEMIA, UNSPECIFIED IRON DEFICIENCY ANEMIA TYPE: ICD-10-CM

## 2017-07-07 NOTE — PATIENT INSTRUCTIONS
Uterine Fibroids: Care Instructions  Your Care Instructions    Uterine fibroids are growths in the uterus. Fibroids aren't cancer. Doctors don't know what causes fibroids. Fibroids are very common in women during their childbearing years. Fibroids can grow on the inside of the uterus, in the muscle wall of the uterus, or near the outside wall of the uterus. In some women, fibroids cause painful cramps and heavy periods. In these cases, taking anti-inflammatory medicines, birth control pills, or using an intrauterine device (IUD) often helps decrease symptoms. Sometimes surgery is needed to treat fibroids. But if you are near menopause, you may want to wait and see if your symptoms get better. Most fibroids shrink and go away after menopause, when your menstrual periods stop completely. Follow-up care is a key part of your treatment and safety. Be sure to make and go to all appointments, and call your doctor if you are having problems. It's also a good idea to know your test results and keep a list of the medicines you take. How can you care for yourself at home? · If your doctor gave you medicine, take it as exactly as prescribed. Call your doctor if you think you are having a problem with your medicine. · Take anti-inflammatory medicines for pain. These include ibuprofen (Advil, Motrin) and naproxen (Aleve). Read and follow all instructions on the label. · Use heat, such as a hot water bottle or a heating pad set on low, or a warm bath to relax tense muscles and relieve cramping. Put a thin cloth between the heating pad and your skin. Never go to sleep with a heating pad on. · Lie down and put a pillow under your knees. Or, lie on your side and bring your knees up to your chest. These positions may help relieve belly pain or pressure. · Keep track of how many sanitary pads or tampons you use each day. · Get at least 30 minutes of exercise on most days of the week. Walking is a good choice.  You also may want to do other activities, such as running, swimming, cycling, or playing tennis or team sports. · If you bleed longer than usual or have heavy bleeding, take a daily multivitamin with iron. When should you call for help? Call 911 anytime you think you may need emergency care. For example, call if:  · You passed out (lost consciousness). · You have sudden, severe pain in your belly or pelvis. Call your doctor now or seek immediate medical care if:  · You have severe vaginal bleeding. This means that you are soaking through your usual pads each hour for 2 or more hours. · You have new belly or pelvic pain. · You are dizzy or lightheaded, or you feel like you may faint. · You have new or unexpected vaginal bleeding. Watch closely for changes in your health, and be sure to contact your doctor if:  · You have new vaginal discharge. · You have ongoing heavy or irregular vaginal bleeding. · You have pelvic pain or a heavy feeling in your lower belly that doesn't go away. · You have to urinate often. · You are more constipated than usual.  Where can you learn more? Go to http://shonda-ritchie.info/. Enter B121 in the search box to learn more about \"Uterine Fibroids: Care Instructions. \"  Current as of: October 13, 2016  Content Version: 11.3  © 1486-0075 [employer]. Care instructions adapted under license by [employer] (which disclaims liability or warranty for this information). If you have questions about a medical condition or this instruction, always ask your healthcare professional. [place] [address] any warranty or liability for your use of this information.

## 2017-07-07 NOTE — PROGRESS NOTES
Pelvic Pain evaluation    Ashley Tucker is a 27 y.o. female who complains of pelvic pain. The pain is described as cramping, and is 6/10 in intensity. Pain is located in the lower abdomen without radiation. Pain was present 3 weeks when she went to the ER. She feels better now. Fibroids noted on CT and US in ER. Also ascites or hemorrhage noted. She states that she had an [address] in November 2016 and fibroids noted then. The pain started 2 weeks ago. Her symptoms have been rapidly improving since. Aggravating factors: none. Alleviating factors: none. Associated symptoms: none. The patient denies diarrhea and fever. Patient was seen in the ER on 06/15/2017 and had a CT and US that revealed:  Enlarged uterus and possible fibroids with peritoneal fluid noted, either ascites or hemorrhage. Hgb was 8.5 and she states that she has always been anemic. Past Medical History:   Diagnosis Date    Ectopic pregnancy     Fibroids     uterine     Past Surgical History:   Procedure Laterality Date    HX SALPINECTOMY  Right 03/2012    ectopic     Social History     Occupational History    Not on file. Social History Main Topics    Smoking status: Never Smoker    Smokeless tobacco: Never Used    Alcohol use No    Drug use: No    Sexual activity: Not Currently     Family History   Problem Relation Age of Onset    Hypertension Mother        No Known Allergies  Prior to Admission medications    Medication Sig Start Date End Date Taking? Authorizing Provider   ibuprofen (MOTRIN) 600 mg tablet Take 1 Tab by mouth every six (6) hours as needed for Pain. 6/15/17   Denver Maze, MD   HYDROcodone-acetaminophen [facility]) 5-325 mg per tablet Take 1 Tab by mouth every four (4) hours as needed for Pain. Max Daily Amount: 6 Tabs. 6/15/17   Denver Maze, MD   ondansetron (ZOFRAN ODT) 4 mg disintegrating tablet Take 1 Tab by mouth every eight (8) hours as needed for Nausea.  6/15/17   Denver Maze, MD Review of Systems: History obtained from the patient  Constitutional: negative for weight loss, fever, night sweats  Breast: negative for breast lumps, nipple discharge, galactorrhea  GI: negative for change in bowel habits, abdominal pain, black or bloody stools  : negative for frequency, dysuria, hematuria, vaginal discharge  MSK: negative for back pain, joint pain, muscle pain  Skin: negative for itching, rash, hives  Psych: negative for anxiety, depression, change in mood      Objective:    Visit Vitals    /90 (BP 1 Location: Left arm, BP Patient Position: Sitting)    Pulse 77    Resp 19    Ht 5' 4\" (1.626 m)    Wt 153 lb (69.4 kg)    LMP 07/03/2017 (Exact Date)    BMI 26.26 kg/m2       Physical Exam:     Constitutional  · Appearance: well-nourished, well developed, alert, in no acute distress    Gastrointestinal  · Abdominal Examination: abdomen non-tender to palpation, normal bowel sounds, abdominal mass up to umbilicus  · Liver and spleen: no hepatomegaly present, spleen not palpable  · Hernias: no hernias identified    Genitourinary  · External Genitalia: normal appearance for age, no discharge present, no tenderness present, no inflammatory lesions present, no masses present, no atrophy present  · Vagina: normal vaginal vault without central or paravaginal defects, no discharge present, no inflammatory lesions present, no masses present  · Bladder: non-tender to palpation  · Urethra: appears normal  · Cervix: normal   · Uterus: 20 week size  · Adnexa: no adnexal tenderness present, no adnexal masses present  · Perineum: perineum within normal limits, no evidence of trauma, no rashes or skin lesions present  · Anus: anus within normal limits, no hemorrhoids present  · Inguinal Lymph Nodes: no lymphadenopathy present    Skin  · General Inspection: no rash, no lesions identified    Neurologic/Psychiatric  · Mental Status:  · Orientation: grossly oriented to person, place and time  · Mood and Affect: mood normal, affect appropriate    Assessment:  Large fibroids. ? Ascites on last scan. Anemia. Plan:   Repeat US to look for ascites. Repeat CBC. Instructions given to pt.

## 2017-07-08 LAB
BASOPHILS # BLD AUTO: 0.1 X10E3/UL (ref 0–0.2)
BASOPHILS NFR BLD AUTO: 2 %
EOSINOPHIL # BLD AUTO: 0.1 X10E3/UL (ref 0–0.4)
EOSINOPHIL NFR BLD AUTO: 3 %
ERYTHROCYTE [DISTWIDTH] IN BLOOD BY AUTOMATED COUNT: 16 % (ref 12.3–15.4)
HCT VFR BLD AUTO: 35.6 % (ref 34–46.6)
HGB BLD-MCNC: 10.6 G/DL (ref 11.1–15.9)
IMM GRANULOCYTES # BLD: 0 X10E3/UL (ref 0–0.1)
IMM GRANULOCYTES NFR BLD: 0 %
LYMPHOCYTES # BLD AUTO: 1.5 X10E3/UL (ref 0.7–3.1)
LYMPHOCYTES NFR BLD AUTO: 52 %
MCH RBC QN AUTO: 24.2 PG (ref 26.6–33)
MCHC RBC AUTO-ENTMCNC: 29.8 G/DL (ref 31.5–35.7)
MCV RBC AUTO: 81 FL (ref 79–97)
MONOCYTES # BLD AUTO: 0.3 X10E3/UL (ref 0.1–0.9)
MONOCYTES NFR BLD AUTO: 10 %
MORPHOLOGY BLD-IMP: ABNORMAL
NEUTROPHILS # BLD AUTO: 1 X10E3/UL (ref 1.4–7)
NEUTROPHILS NFR BLD AUTO: 33 %
PLATELET # BLD AUTO: 355 X10E3/UL (ref 150–379)
RBC # BLD AUTO: 4.38 X10E6/UL (ref 3.77–5.28)
WBC # BLD AUTO: 3 X10E3/UL (ref 3.4–10.8)

## 2017-07-14 ENCOUNTER — OFFICE VISIT (OUTPATIENT)
Dept: OBGYN CLINIC | Age: 32
End: 2017-07-14

## 2017-07-14 DIAGNOSIS — R10.2 PELVIC PAIN IN FEMALE: Primary | ICD-10-CM

## 2017-07-14 DIAGNOSIS — D25.9 UTERINE LEIOMYOMA, UNSPECIFIED LOCATION: ICD-10-CM

## 2017-07-14 NOTE — PROGRESS NOTES
Ultrasound followup    Edward Francis is a 27 y.o. female is here today to review the results of her ultrasound evaluation. Her U/S evaluation is performed because of a previous encounter revealing fibroids which was identified several weeks ago. She is here for a follow up ultrasound study. The sonogram results are:  UTERUS IS ANTEVERTED, ENLARGED IN SIZE AND HETEROGENOUS IN ECHOGENICITY. MULTIPLE FIBROIDS ARE NOTED. THE TWO MOST PROMINANT ARE MEASURED ABOVE HOWEVER THE  BORDERS ARE DIFFICULT TO DEFINE. FIBROID 1, FUNDAL RIGHT  FIBROID 2, FUNDAL LEFT  ENDOMETRIUM IS NOT VISUALIZED DUE TO THE FIBROIDS. RIGHT OVARY APPEARS WITHIN NORMAL LIMITS. A FOLLICULAR CYST IS SEEN. LEFT ADNEXA APPEARS WITHIN NORMAL LIMITS. NO FREE FLUID SEEN IN THE CDS. See detailed report for more information    No ascites on this US. She was offerred FANG, or consider myomectomy, [place] or observation. She is on MVI    Total face-to-face time with the patient was 15 minutes, and over half of this time was spent in patient counseling.